# Patient Record
Sex: MALE | Race: WHITE | Employment: OTHER
[De-identification: names, ages, dates, MRNs, and addresses within clinical notes are randomized per-mention and may not be internally consistent; named-entity substitution may affect disease eponyms.]

---

## 2020-10-08 ENCOUNTER — TRANSCRIBE ORDERS (OUTPATIENT)
Dept: OTHER | Age: 45
End: 2020-10-08

## 2020-10-08 DIAGNOSIS — R63.0 POOR APPETITE: ICD-10-CM

## 2020-10-08 DIAGNOSIS — R11.0 NAUSEA: Primary | ICD-10-CM

## 2020-10-08 DIAGNOSIS — R63.4 WEIGHT LOSS: ICD-10-CM

## 2020-10-30 ENCOUNTER — OFFICE VISIT (OUTPATIENT)
Dept: NEUROLOGY | Facility: CLINIC | Age: 45
End: 2020-10-30
Payer: COMMERCIAL

## 2020-10-30 VITALS
HEIGHT: 73 IN | BODY MASS INDEX: 25.18 KG/M2 | SYSTOLIC BLOOD PRESSURE: 116 MMHG | HEART RATE: 110 BPM | WEIGHT: 190 LBS | DIASTOLIC BLOOD PRESSURE: 78 MMHG | OXYGEN SATURATION: 100 %

## 2020-10-30 DIAGNOSIS — R56.9 CONVULSIONS, UNSPECIFIED CONVULSION TYPE (H): ICD-10-CM

## 2020-10-30 DIAGNOSIS — R41.3 MEMORY LOSS: Primary | ICD-10-CM

## 2020-10-30 PROCEDURE — 99204 OFFICE O/P NEW MOD 45 MIN: CPT | Performed by: PSYCHIATRY & NEUROLOGY

## 2020-10-30 RX ORDER — ALPRAZOLAM 1 MG
1 TABLET ORAL 2 TIMES DAILY PRN
COMMUNITY
Start: 2020-01-29 | End: 2021-01-22

## 2020-10-30 RX ORDER — MESALAMINE 800 MG/1
800 TABLET, DELAYED RELEASE ORAL 2 TIMES DAILY
COMMUNITY

## 2020-10-30 RX ORDER — SUMATRIPTAN 100 MG/1
100 TABLET, FILM COATED ORAL
COMMUNITY
Start: 2020-10-19

## 2020-10-30 RX ORDER — LAMOTRIGINE 25 MG/1
TABLET ORAL
COMMUNITY
Start: 2020-10-15 | End: 2020-12-03

## 2020-10-30 RX ORDER — BUPROPION HYDROCHLORIDE 150 MG/1
1 TABLET ORAL DAILY
COMMUNITY
Start: 2020-10-27 | End: 2021-01-22

## 2020-10-30 RX ORDER — NORTRIPTYLINE HYDROCHLORIDE 50 MG/1
50 CAPSULE ORAL AT BEDTIME
COMMUNITY

## 2020-10-30 ASSESSMENT — PAIN SCALES - GENERAL: PAINLEVEL: NO PAIN (0)

## 2020-10-30 ASSESSMENT — MIFFLIN-ST. JEOR: SCORE: 1810.71

## 2020-10-30 NOTE — PROGRESS NOTES
Neurology History and Physical   [facility]    Patient:  Levy Bain  :  1975   Age:  44 year old   Today's Office Visit:  10/30/2020    Referring Provider:    No referring provider defined for this encounter.    History of Present Illness:    Levy Bain is a 44 year old right-handed male who presented for evaluation of his seizures.  The patient is accompanied by his mother-in-law, and his wife is also contributing to the history on the phone,   Patient had a seizure on 10/13/2020.  He collapsed in his office at home. At 8:30 am, he felt fine, no prodromes, came home after sending the kids to school. Next thing he remembers he was crawling on the floor. His wife witnessed the seizure.  His eyes were open, but he was unresponsive, he was stiff and shaking. No foaming or drooling, no tongue bite or urinary incontinence. Seizure lasted at least 2-3 min, and he didn't know he had a seizure. Ambulance picked him up right away, he came to when ambulance arrived. He had difficulty breathing. He had another seizure in the hospital around 1 pm. It was reported as GTC seizure, lasting couple minutes.    Wife is sure he had another episode couple months ago. He fell on one of the drawers in the garage, and had a cut across the chest and was bleeding, was sweaty and uneasy, and was confused. He didn't know what was happening.    4 years ago something similar happened.  They were in [place]. He felt really hot, didn't feel well, suddenly his head and eyes went back, and he started shaking and grunting.  They thought it was due to a lot a marijuana.   He denies warning before his seizures and is amnestic about them. Family hasn't noted any laterality with the seizures.   He was started on lamotrigine.  He is taking 25-50 now and is instructed to gradually increase to 100-100.   His memory is getting really bad in the past year, and especially in the past couple months. He also had severe headache  following the recent seizure.   He used to drink a lot occasionally- lauro drinking, he quit since last seizures.   Epilepsy Risk Factors:  No history of meningitis, encephalitis, no significant head injury. Her aunt had one seizure that he is aware of.   Precipitating factors:  Stress, lack of sleep.   Past Medical History: History of migraine, bipolar disorder, PTSD, severe depression and anxiety.   Social History     Socioeconomic History     Marital status:      Spouse name: Not on file     Number of children: Not on file     Years of education: Not on file     Highest education level: Not on file   Occupational History     Not on file   Social Needs     Financial resource strain: Not on file     Food insecurity     Worry: Not on file     Inability: Not on file     Transportation needs     Medical: Not on file     Non-medical: Not on file   Tobacco Use     Smoking status: Current Every Day Smoker     Smokeless tobacco: Never Used   Substance and Sexual Activity     Alcohol use: Not on file     Drug use: Not on file     Sexual activity: Not on file   Lifestyle     Physical activity     Days per week: Not on file     Minutes per session: Not on file     Stress: Not on file   Relationships     Social connections     Talks on phone: Not on file     Gets together: Not on file     Attends Christian service: Not on file     Active member of club or organization: Not on file     Attends meetings of clubs or organizations: Not on file     Relationship status: Not on file     Intimate partner violence     Fear of current or ex partner: Not on file     Emotionally abused: Not on file     Physically abused: Not on file     Forced sexual activity: Not on file   Other Topics Concern     Not on file   Social History Narrative     Not on file      Social/educational:  He owns a remodeling company, he has an associate degree. He did not need any special education. He is  and has 3 boys. He was a heavy drinker, binge  "drinking 10-12 drink at a time, but quit since his last seizure. He smokes 2 cigarettes a day, he uses medicinal marijuana that his psychiatrist prescribes.   Driving:  Currently patient is not driving.   Previous Evaluations for Epilepsy:  [facility]  MRI of Brain 10/13/2020: Normal  Review of Systems:  Lethargy / Tiredness:  No  Nausea / Vomiting:  No  Double Vision:  No  Sleepiness:  No  Depression:  No  Slowed Cognitive Function:  No  Memory Problems:  Yes  Poor Balance:  Yes  Dizziness:  Yes  Blurred Vision:  Yes    Current Outpatient Medications   Medication Sig Dispense Refill     ALPRAZolam (XANAX) 1 MG tablet Take 1 mg by mouth 2 times daily as needed       lamoTRIgine (LAMICTAL) 25 MG tablet        SUMAtriptan (IMITREX) 100 MG tablet Take 100 mg by mouth at onset of headache       buPROPion (WELLBUTRIN XL) 150 MG 24 hr tablet Take 1 tablet by mouth daily       mesalamine (ASACOL HD) 800 MG EC tablet Take 800 mg by mouth 3 times daily       nortriptyline (PAMELOR) 50 MG capsule Take 50 mg by mouth At Bedtime       omeprazole (PRILOSEC) 20 MG DR capsule Take 1 capsule by mouth daily       Past AEDs:  AED - ANTIEPILEPTIC DRUGS 10/15/2020   lamoTRIgine (Oral)  (25 MG TABS)       Exam:    /78   Pulse 110   Ht 1.854 m (6' 1\")   Wt 86.2 kg (190 lb)   SpO2 100%   BMI 25.07 kg/m       Wt Readings from Last 5 Encounters:   10/30/20 86.2 kg (190 lb)     GENERAL APPEARANCE:  Alert, awake, cooperative, in no apparent distress.      NEUROLOGICAL EXAMINATION:   MENTAL STATUS:  Alert and oriented  LANGUAGE/SPEECH:  No aphasia or dysarthria.   CRANIAL NERVES:  Pupils are round and reactive to light.  Extraocular movements are intact.  Facial sensation is intact to light touch.  Face is symmetric.  Tongue is midline.  Shrug shoulder is normal.  Hearing is intact to voice.   MOTOR:  Normal tone, bulk and strength 5/5 with no pronator drift.   SENSATION:  Intact to light " touch  COORDINATION:  Normal finger to nose.  No dysmetria or tremor.   REFLEXES:  Brisk all over.  Toes are downgoing.   GAIT:  Gait and tandem gait are steady.     Assessment and Plan:  patient has had 4 episodes of loss of consciousness, 3 of them witnessed and was reported as generalized tonic clonic seizure. His MRI and EEG has been reported normal (I only have the reports). I discussed doing an EEG to look for interictal abnormalities to find out what type of epilepsy he has. He denies auras and family hasn't noted any lateralizing signs with his seizures.     The patient doesn't drive, however, [place] regulations on seizures and driving were reviewed with the patient.  The patient clearly understands that she/he is prohibited from operating a motor vehicle within 3 months following any seizure (that will impair control of car) or other episode with sudden unconsciousness or inability to sit up, and that she/he is required to report this most recent seizure to the [facility] within 30 days after the event.    Avoid any activities that might lead to self-injury or injury of others, within 3 months following any seizure with impaired awareness or impaired motor control such activities include but are not limited to operating power tools, operating firearms, climbing ladders/trees/exposure to heights from which he might fall, exposure to vessels with hot cooking oil or water, and swimming alone.    2. Memory loss: this can be multifactorial, the underlying etiology for his epilepsy, his drinking history and 2 GTC seizures in one day. I suggested to do a neuropsychological evaluation for further evaluation.     - Continue tapering up lamotrigine to 100 mg bid  - Obtain lamotrigine level after reaching the goal dose  - 3 hour vEEG  - Neuropsychological evaluation  - Follow up in 2 months      As described above, I met with the patient for 40 minutes and during this time counseling was greater than 50% of the visit  time.  Rahel Elena MD

## 2020-10-30 NOTE — PATIENT INSTRUCTIONS
Patient Education     Electroencephalography (EEG)    Electroencephalography (EEG) is a test that measures your electrical brain wave activity. Brain cells (or neurons) communicate through electrical signals. These electrical patterns are measured by the EEG showing either normal or abnormal brain activity. The EEG is safe and painless.  What is EEG used for?  Your healthcare provider may order this test to check for seizures or other brain problems. For this test, several small metal disks (electrodes) are attached to the scalp with adhesives, or with water-based gel or paste. During the test, wavy lines (waveforms) are recorded on a screen or on paper. They will be studied to assess your brain function. In some people who are prone to seizures, parts of this test may slightly increase their chance of having a seizure. Sometimes it is necessary to repeat an EEG with sleep deprivation. During the test, you may be shown flashing lights. You may also be asked to take deep breaths multiple times during a short time period (hyperventilation). You may also be asked to go to sleep during the test.   EEG may be done in a doctor's office or a hospital lab. The test typically takes less than an hour, although much of the time is spent attaching the electrodes. Sometimes, the electrodes are left on for several hours or days so that the EEG test can record brain waves for a longer period of time. In these cases, you may need to stay in the hospital or can go home with a portable EEG recorder.   Before your test  Prepare for your test as instructed. Wash and dry your hair. But, don't use any hairstyling products. Your scalp and hair should be clean and free of excess oil. Take your routine medicines, unless told not to. You may be asked to sleep during the EEG. To help you do this, you may be told to stay up all or part of the night before the test. Or, you may be given medicine to help you sleep during the test. If so,  someone will need to drive you home after the test. Your test will take about 60 minutes. Arrive with enough time to check in.  My next appointment is:  ______________________________   For your safety and for the success of your test, tell the technologist about:    Any prescription or over-the-counter medicines, supplements, or herbs you take    Any seizures you may have had in the past   [employer] last reviewed this educational content on 12/1/2017 2000-2020 [employer]. [address] [place] [address]. All rights reserved. This information is not intended as a substitute for professional medical care. Always follow your healthcare professional's instructions.

## 2020-10-30 NOTE — LETTER
Date:November 5, 2020      Patient was self referred, no letter generated. Do not send.        [facility] Information

## 2020-10-30 NOTE — NURSING NOTE
"Levy Bain's goals for this visit include: consult  He requests these members of his care team be copied on today's visit information:     PCP: System, Provider Not In    Referring Provider:  No referring provider defined for this encounter.    /78   Pulse 110   Ht 1.854 m (6' 1\")   Wt 86.2 kg (190 lb)   SpO2 100%   BMI 25.07 kg/m      Do you need any medication refills at today's visit? n  "

## 2020-10-30 NOTE — LETTER
10/30/2020         RE: Levy Bain  [address] [place] [address]        Dear Colleague,    Thank you for referring your patient, Levy Bain, to the [facility] [place]. Please see a copy of my visit note below.    Neurology History and Physical   [facility]    Patient:  Levy Bain  :  1975   Age:  44 year old   Today's Office Visit:  10/30/2020    Referring Provider:    No referring provider defined for this encounter.    History of Present Illness:    Levy Bain is a 44 year old right-handed male who presented for evaluation of his seizures.  The patient is accompanied by his mother-in-law, and his wife is also contributing to the history on the phone,   Patient had a seizure on 10/13/2020.  He collapsed in his office at home. At 8:30 am, he felt fine, no prodromes, came home after sending the kids to school. Next thing he remembers he was crawling on the floor. His wife witnessed the seizure.  His eyes were open, but he was unresponsive, he was stiff and shaking. No foaming or drooling, no tongue bite or urinary incontinence. Seizure lasted at least 2-3 min, and he didn't know he had a seizure. Ambulance picked him up right away, he came to when ambulance arrived. He had difficulty breathing. He had another seizure in the hospital around 1 pm. It was reported as GTC seizure, lasting couple minutes.    Wife is sure he had another episode couple months ago. He fell on one of the drawers in the garage, and had a cut across the chest and was bleeding, was sweaty and uneasy, and was confused. He didn't know what was happening.    4 years ago something similar happened.  They were in [place]. He felt really hot, didn't feel well, suddenly his head and eyes went back, and he started shaking and grunting.  They thought it was due to a lot a marijuana.   He denies warning before his seizures and is amnestic about them. Family hasn't noted any  laterality with the seizures.   He was started on lamotrigine.  He is taking 25-50 now and is instructed to gradually increase to 100-100.   His memory is getting really bad in the past year, and especially in the past couple months. He also had severe headache following the recent seizure.   He used to drink a lot occasionally- lauro drinking, he quit since last seizures.   Epilepsy Risk Factors:  No history of meningitis, encephalitis, no significant head injury. Her aunt had one seizure that he is aware of.   Precipitating factors:  Stress, lack of sleep.   Past Medical History: History of migraine, bipolar disorder, PTSD, severe depression and anxiety.   Social History     Socioeconomic History     Marital status:      Spouse name: Not on file     Number of children: Not on file     Years of education: Not on file     Highest education level: Not on file   Occupational History     Not on file   Social Needs     Financial resource strain: Not on file     Food insecurity     Worry: Not on file     Inability: Not on file     Transportation needs     Medical: Not on file     Non-medical: Not on file   Tobacco Use     Smoking status: Current Every Day Smoker     Smokeless tobacco: Never Used   Substance and Sexual Activity     Alcohol use: Not on file     Drug use: Not on file     Sexual activity: Not on file   Lifestyle     Physical activity     Days per week: Not on file     Minutes per session: Not on file     Stress: Not on file   Relationships     Social connections     Talks on phone: Not on file     Gets together: Not on file     Attends Latter-day service: Not on file     Active member of club or organization: Not on file     Attends meetings of clubs or organizations: Not on file     Relationship status: Not on file     Intimate partner violence     Fear of current or ex partner: Not on file     Emotionally abused: Not on file     Physically abused: Not on file     Forced sexual activity: Not on file  "  Other Topics Concern     Not on file   Social History Narrative     Not on file      Social/educational:  He owns a remodeling company, he has an associate degree. He did not need any special education. He is  and has 3 boys. He was a heavy drinker, binge drinking 10-12 drink at a time, but quit since his last seizure. He smokes 2 cigarettes a day, he uses medicinal marijuana that his psychiatrist prescribes.   Driving:  Currently patient is not driving.   Previous Evaluations for Epilepsy:  EEG: 10/13/2020 at [facility] Care: normal  MRI of Brain 10/13/2020: Normal  Review of Systems:  Lethargy / Tiredness:  No  Nausea / Vomiting:  No  Double Vision:  No  Sleepiness:  No  Depression:  No  Slowed Cognitive Function:  No  Memory Problems:  Yes  Poor Balance:  Yes  Dizziness:  Yes  Blurred Vision:  Yes    Current Outpatient Medications   Medication Sig Dispense Refill     ALPRAZolam (XANAX) 1 MG tablet Take 1 mg by mouth 2 times daily as needed       lamoTRIgine (LAMICTAL) 25 MG tablet        SUMAtriptan (IMITREX) 100 MG tablet Take 100 mg by mouth at onset of headache       buPROPion (WELLBUTRIN XL) 150 MG 24 hr tablet Take 1 tablet by mouth daily       mesalamine (ASACOL HD) 800 MG EC tablet Take 800 mg by mouth 3 times daily       nortriptyline (PAMELOR) 50 MG capsule Take 50 mg by mouth At Bedtime       omeprazole (PRILOSEC) 20 MG DR capsule Take 1 capsule by mouth daily       Past AEDs:  AED - ANTIEPILEPTIC DRUGS 10/15/2020   lamoTRIgine (Oral)  (25 MG TABS)       Exam:    /78   Pulse 110   Ht 1.854 m (6' 1\")   Wt 86.2 kg (190 lb)   SpO2 100%   BMI 25.07 kg/m       Wt Readings from Last 5 Encounters:   10/30/20 86.2 kg (190 lb)     GENERAL APPEARANCE:  Alert, awake, cooperative, in no apparent distress.      NEUROLOGICAL EXAMINATION:   MENTAL STATUS:  Alert and oriented  LANGUAGE/SPEECH:  No aphasia or dysarthria.   CRANIAL NERVES:  Pupils are round and reactive to light.  Extraocular movements " are intact.  Facial sensation is intact to light touch.  Face is symmetric.  Tongue is midline.  Shrug shoulder is normal.  Hearing is intact to voice.   MOTOR:  Normal tone, bulk and strength 5/5 with no pronator drift.   SENSATION:  Intact to light touch  COORDINATION:  Normal finger to nose.  No dysmetria or tremor.   REFLEXES:  Brisk all over.  Toes are downgoing.   GAIT:  Gait and tandem gait are steady.     Assessment and Plan:  patient has had 4 episodes of loss of consciousness, 3 of them witnessed and was reported as generalized tonic clonic seizure. His MRI and EEG has been reported normal (I only have the reports). I discussed doing an EEG to look for interictal abnormalities to find out what type of epilepsy he has. He denies auras and family hasn't noted any lateralizing signs with his seizures.     The patient doesn't drive, however, [place] regulations on seizures and driving were reviewed with the patient.  The patient clearly understands that she/he is prohibited from operating a motor vehicle within 3 months following any seizure (that will impair control of car) or other episode with sudden unconsciousness or inability to sit up, and that she/he is required to report this most recent seizure to the DMV within 30 days after the event.    Avoid any activities that might lead to self-injury or injury of others, within 3 months following any seizure with impaired awareness or impaired motor control such activities include but are not limited to operating power tools, operating firearms, climbing ladders/trees/exposure to heights from which he might fall, exposure to vessels with hot cooking oil or water, and swimming alone.    2. Memory loss: this can be multifactorial, the underlying etiology for his epilepsy, his drinking history and 2 GTC seizures in one day. I suggested to do a neuropsychological evaluation for further evaluation.     - Continue tapering up lamotrigine to 100 mg bid  - Obtain  lamotrigine level after reaching the goal dose  - 3 hour vEEG  - Neuropsychological evaluation  - Follow up in 2 months      As described above, I met with the patient for 40 minutes and during this time counseling was greater than 50% of the visit time.  Rahel Elena MD          Again, thank you for allowing me to participate in the care of your patient.        Sincerely,        Rahel Elena MD

## 2020-11-12 ENCOUNTER — ANCILLARY PROCEDURE (OUTPATIENT)
Dept: NEUROLOGY | Facility: CLINIC | Age: 45
End: 2020-11-12
Attending: PSYCHIATRY & NEUROLOGY
Payer: COMMERCIAL

## 2020-11-16 ENCOUNTER — NURSE TRIAGE (OUTPATIENT)
Dept: NURSING | Facility: CLINIC | Age: 45
End: 2020-11-16

## 2020-11-16 NOTE — TELEPHONE ENCOUNTER
Patient has medication question re: mesalamine. Precsribed by Dr. Mamadou Whitley.  Clinic he goes to is [address] advised to have him call Dr. Whitley's clinic at [phone]    He verbalized understanding.    Marry Morgan RN/[place] Nurse Advisor    Additional Information    General information question, no triage required and triager able to answer question    Protocols used: INFORMATION ONLY CALL-A-AH

## 2020-11-23 ENCOUNTER — VIRTUAL VISIT (OUTPATIENT)
Dept: NEUROLOGY | Facility: CLINIC | Age: 45
End: 2020-11-23
Payer: COMMERCIAL

## 2020-11-23 ENCOUNTER — TELEPHONE (OUTPATIENT)
Dept: NEUROLOGY | Facility: CLINIC | Age: 45
End: 2020-11-23

## 2020-11-23 DIAGNOSIS — G40.409 GENERALIZED TONIC-CLONIC SEIZURE (H): Primary | ICD-10-CM

## 2020-11-23 SDOH — HEALTH STABILITY: MENTAL HEALTH: HOW OFTEN DO YOU HAVE A DRINK CONTAINING ALCOHOL?: NEVER

## 2020-11-23 ASSESSMENT — PATIENT HEALTH QUESTIONNAIRE - PHQ9: SUM OF ALL RESPONSES TO PHQ QUESTIONS 1-9: 16

## 2020-11-23 NOTE — LETTER
"2020       RE: Levy Bain  : 1975   MRN: [id number]      Dear Colleague,    Thank you for referring your patient, Levy Bain, to the [facility] EPILEPSY CARE at [facility]. Please see a copy of my visit note below.    Levy Bain is a 44 year old male who is being evaluated via a billable telephone visit.      The patient has been notified of following:     \"This telephone visit will be conducted via a call between you and your physician/provider. We have found that certain health care needs can be provided without the need for a physical exam.  This service lets us provide the care you need with a short phone conversation.  If a prescription is necessary we can send it directly to your pharmacy.  If lab work is needed we can place an order for that and you can then stop by our lab to have the test done at a later time.    Telephone visits are billed at different rates depending on your insurance coverage. During this emergency period, for some insurers they may be billed the same as an in-person visit.  Please reach out to your insurance provider with any questions.    If during the course of the call the physician/provider feels a telephone visit is not appropriate, you will not be charged for this service.\"    Patient has given verbal consent for Telephone visit?  Yes    What phone number would you like to be contacted at? [phone]    How would you like to obtain your AVS? [facility]      [facility] Epilepsy Care Progress Note      Patient:  Levy Bain  :  1975   Age:  44 year old   Today's virtual Visit:  2020    History of Present Illness:    Levy is participating in this virtual visit with his wife for a follow up on his seizures.  He was last seen last month for evaluation of episodes of loss of consciousness and convulsions.  His most recent episodes where on , which he had 2 episodes within 24 hours.  His EEG and " outside MRI were normal.  He had at least couple more episodes of loss of consciousness and possible convulsions in the past.  He is currently taking lamotrigine 75 mg twice a day which he is going to increase to 75 mg in the morning and 100 mg at night tomorrow.  He occasionally gets dizzy, he is taking his lamotrigine on empty stomach.    Memory loss: Levy was complaining of memory loss in the previous visit, which has been also noted by family members.  He had some memory problems in the past, which has been worse in the first couple weeks following his recent seizures.  His wife believes currently it's better compared to the time immediately following his recent seizures and is somewhat similar to what where he was before these recent seizures, however Levy himself feels his memory is worse now. It's both long-term and short-term.  He has an appointment for neuropsychological evaluation on January 5, 2020.      Current Outpatient Medications   Medication Sig Dispense Refill     ALPRAZolam (XANAX) 1 MG tablet Take 1 mg by mouth 2 times daily as needed       buPROPion (WELLBUTRIN XL) 150 MG 24 hr tablet Take 1 tablet by mouth daily       lamoTRIgine (LAMICTAL) 25 MG tablet Taking 3 25mg tablets in AM and PM. Will start taking 3 in AM and 4 in PM on 11/24/20       mesalamine (ASACOL HD) 800 MG EC tablet Take 800 mg by mouth 3 times daily       nortriptyline (PAMELOR) 50 MG capsule Take 50 mg by mouth At Bedtime       omeprazole (PRILOSEC) 20 MG DR capsule Take 1 capsule by mouth daily       SUMAtriptan (IMITREX) 100 MG tablet Take 100 mg by mouth at onset of headache          Review of Systems:  Lethargy / Tiredness:  No  Nausea / Vomiting:  No  Double Vision:  No  Blurred vision: Yes  Slowed Cognitive Function:  No  Memory Problems:  Yes  Poor Balance:  No  Dizziness:  Yes, occasionally after taking lamotrigine on empty stomach.  Blurred Vision:  Yes, occasionally, he had it before starting the  medication.    Other Issues:    Is patient safe to drive:  The patient should avoid driving until January 13, 2021.      Assessment and Plan:   Generalized tonic-clonic seizures: Probable focal to bilateral tonic-clonic seizures.  Patient's EEG and MRI have been normal.  Since patient had more than 1 episode of loss of consciousness and convulsion, I recommended that he continue taking an antiseizure medication.  He is currently in the process of tapering up his lamotrigine.  I suggested to check his lamotrigine level after he reached 100 mg twice a day and we will adjust the dose accordingly.    [place] regulations on seizures and driving were reviewed with the patient.  The patient clearly understands that she/he is prohibited from operating a motor vehicle within 3 months following any seizure (that will impair control of car) or other episode with sudden unconsciousness or inability to sit up, and that she/he is required to report this most recent seizure to the DMV within 30 days after the event.    Avoid any activities that might lead to self-injury or injury of others, within 3 months following any seizure with impaired awareness or impaired motor control such activities include but are not limited to operating power tools, operating firearms, climbing ladders/trees/exposure to heights from which he might fall, exposure to vessels with hot cooking oil or water, and swimming alone.    -Continue tapering lamotrigine up to 100 mg twice a day.  -Obtain lamotrigine level 1 week after reaching to 100 mg twice a day.  -Follow-up in 3 months        As described above, I talked with the patient and his wife for 18 minutes via D[employer]im[employer] and during this time counseling was greater than 50% of the visit time.  Rahel Elena MD                          Again, thank you for allowing me to participate in the care of your patient.      Sincerely,    Rahel Elena MD

## 2020-11-23 NOTE — PROGRESS NOTES
"Levy Bain is a 44 year old male who is being evaluated via a billable telephone visit.      The patient has been notified of following:     \"This telephone visit will be conducted via a call between you and your physician/provider. We have found that certain health care needs can be provided without the need for a physical exam.  This service lets us provide the care you need with a short phone conversation.  If a prescription is necessary we can send it directly to your pharmacy.  If lab work is needed we can place an order for that and you can then stop by our lab to have the test done at a later time.    Telephone visits are billed at different rates depending on your insurance coverage. During this emergency period, for some insurers they may be billed the same as an in-person visit.  Please reach out to your insurance provider with any questions.    If during the course of the call the physician/provider feels a telephone visit is not appropriate, you will not be charged for this service.\"    Patient has given verbal consent for Telephone visit?  Yes    What phone number would you like to be contacted at? [phone]    How would you like to obtain your AVS? [place]      [facility]      Patient:  Levy Bain  :  1975   Age:  44 year old   Today's virtual Visit:  2020    History of Present Illness:    Levy is participating in this virtual visit with his wife for a follow up on his seizures.  He was last seen last month for evaluation of episodes of loss of consciousness and convulsions.  His most recent episodes where on , which he had 2 episodes within 24 hours.  His EEG and outside MRI were normal.  He had at least couple more episodes of loss of consciousness and possible convulsions in the past.  He is currently taking lamotrigine 75 mg twice a day which he is going to increase to 75 mg in the morning and 100 mg at night tomorrow.  He occasionally " gets dizzy, he is taking his lamotrigine on empty stomach.    Memory loss: Levy was complaining of memory loss in the previous visit, which has been also noted by family members.  He had some memory problems in the past, which has been worse in the first couple weeks following his recent seizures.  His wife believes currently it's better compared to the time immediately following his recent seizures and is somewhat similar to what where he was before these recent seizures, however Levy himself feels his memory is worse now. It's both long-term and short-term.  He has an appointment for neuropsychological evaluation on January 5, 2020.      Current Outpatient Medications   Medication Sig Dispense Refill     ALPRAZolam (XANAX) 1 MG tablet Take 1 mg by mouth 2 times daily as needed       buPROPion (WELLBUTRIN XL) 150 MG 24 hr tablet Take 1 tablet by mouth daily       lamoTRIgine (LAMICTAL) 25 MG tablet Taking 3 25mg tablets in AM and PM. Will start taking 3 in AM and 4 in PM on 11/24/20       mesalamine (ASACOL HD) 800 MG EC tablet Take 800 mg by mouth 3 times daily       nortriptyline (PAMELOR) 50 MG capsule Take 50 mg by mouth At Bedtime       omeprazole (PRILOSEC) 20 MG DR capsule Take 1 capsule by mouth daily       SUMAtriptan (IMITREX) 100 MG tablet Take 100 mg by mouth at onset of headache          Review of Systems:  Lethargy / Tiredness:  No  Nausea / Vomiting:  No  Double Vision:  No  Blurred vision: Yes  Slowed Cognitive Function:  No  Memory Problems:  Yes  Poor Balance:  No  Dizziness:  Yes, occasionally after taking lamotrigine on empty stomach.  Blurred Vision:  Yes, occasionally, he had it before starting the medication.    Other Issues:    Is patient safe to drive:  The patient should avoid driving until January 13, 2021.      Assessment and Plan:   Generalized tonic-clonic seizures: Probable focal to bilateral tonic-clonic seizures.  Patient's EEG and MRI have been normal.  Since patient had more than  1 episode of loss of consciousness and convulsion, I recommended that he continue taking an antiseizure medication.  He is currently in the process of tapering up his lamotrigine.  I suggested to check his lamotrigine level after he reached 100 mg twice a day and we will adjust the dose accordingly.    [place] regulations on seizures and driving were reviewed with the patient.  The patient clearly understands that she/he is prohibited from operating a motor vehicle within 3 months following any seizure (that will impair control of car) or other episode with sudden unconsciousness or inability to sit up, and that she/he is required to report this most recent seizure to the DMV within 30 days after the event.    Avoid any activities that might lead to self-injury or injury of others, within 3 months following any seizure with impaired awareness or impaired motor control such activities include but are not limited to operating power tools, operating firearms, climbing ladders/trees/exposure to heights from which he might fall, exposure to vessels with hot cooking oil or water, and swimming alone.    -Continue tapering lamotrigine up to 100 mg twice a day.  -Obtain lamotrigine level 1 week after reaching to 100 mg twice a day.  -Follow-up in 3 months        As described above, I talked with the patient and his wife for 18 minutes via Doximity and during this time counseling was greater than 50% of the visit time.  Rahel Elena MD

## 2020-12-02 ENCOUNTER — TELEPHONE (OUTPATIENT)
Dept: NEUROLOGY | Facility: CLINIC | Age: 45
End: 2020-12-02

## 2020-12-02 DIAGNOSIS — G40.409 GENERALIZED TONIC-CLONIC SEIZURE (H): Primary | ICD-10-CM

## 2020-12-02 NOTE — TELEPHONE ENCOUNTER
[facility] Call Center    Phone Message    May a detailed message be left on voicemail: yes     Reason for Call: Medication Refill Request    Has the patient contacted the pharmacy for the refill? Yes   Name of medication being requested: lamoTRIgine (LAMICTAL) 25 MG tablet  Provider who prescribed the medication: [facility]/ Gaurav   Pharmacy: [facility]   Date medication is needed: Patient said that Dr. Nolasco wanted to change it to 100 mg tablets in the morning and at night. Please advise. Thank you.      Action Taken: Message routed to:  Adult Clinics: Neurology p [id number]    Travel Screening: Not Applicable

## 2020-12-03 RX ORDER — LAMOTRIGINE 25 MG/1
TABLET ORAL
Qty: 210 TABLET | Refills: 2 | Status: SHIPPED | OUTPATIENT
Start: 2020-12-03 | End: 2020-12-11

## 2020-12-03 RX ORDER — LAMOTRIGINE 25 MG/1
100 TABLET ORAL
Status: CANCELLED | OUTPATIENT
Start: 2020-12-03

## 2020-12-03 NOTE — TELEPHONE ENCOUNTER
Dr Nolasco has never prescribed this medication for the pt. Routed to her to review and enter a new RX.  Heather Almanzar, RNCC  Neurology

## 2020-12-03 NOTE — TELEPHONE ENCOUNTER
The patient called back regarding message below. He says he is out of medication and hoping he can pick this up today. Please advise. Thank you.

## 2020-12-09 ENCOUNTER — TELEPHONE (OUTPATIENT)
Dept: NEUROLOGY | Facility: CLINIC | Age: 45
End: 2020-12-09

## 2020-12-09 NOTE — TELEPHONE ENCOUNTER
Called the pt back to discuss but was unable to reach them, a message was left on their VM requesting a call back to the clinic.   Heather Almanzar RN

## 2020-12-09 NOTE — TELEPHONE ENCOUNTER
[facility] Call Center    Phone Message    May a detailed message be left on voicemail: yes     Reason for Call: Other: pt had a seizure, fell and hit his head. pt went to hospital by ambulance. pt would like to speak with [facility] regarding this seizure episode. please advise     Action Taken: Message routed to:  Adult Clinics: Neurology p [id number]

## 2020-12-10 NOTE — TELEPHONE ENCOUNTER
Situation reviewed briefly with house doctor. It was decided that we should schedule the patient for a telephone visit with Dr. Baez for tomorrow. Patient was agreeable.

## 2020-12-10 NOTE — TELEPHONE ENCOUNTER
RN spoke to the pt who reports a recent trip to the ED for what they assumed was a seizure, see encounter on 12/8/20 from [facility] in [facility].  He was recently seen by Dr Nolasco who had him ramping up his Lamotrigine dose to 100mg twice daily which he has been at for the past 2 weeks.   He would like to know what his next steps should be. Have his Lamotrigine level drawn or make adjustments to his dose?   He was informed that Dr Nolasco is out of the office this week but that we would pass along the message to the team at [facility].    Heather Almanzar, HANNAHCC  Neurology

## 2020-12-11 ENCOUNTER — VIRTUAL VISIT (OUTPATIENT)
Dept: NEUROLOGY | Facility: CLINIC | Age: 45
End: 2020-12-11
Payer: COMMERCIAL

## 2020-12-11 DIAGNOSIS — G40.409 GENERALIZED TONIC-CLONIC SEIZURE (H): ICD-10-CM

## 2020-12-11 RX ORDER — SUMATRIPTAN 100 MG/1
100 TABLET, FILM COATED ORAL
Status: CANCELLED | OUTPATIENT
Start: 2020-12-11

## 2020-12-11 RX ORDER — LAMOTRIGINE 100 MG/1
TABLET ORAL
Qty: 60 TABLET | Refills: 3 | Status: SHIPPED | OUTPATIENT
Start: 2020-12-11 | End: 2021-01-08

## 2020-12-11 RX ORDER — PHENYTOIN SODIUM 100 MG/1
CAPSULE, EXTENDED RELEASE ORAL
Qty: 90 CAPSULE | Refills: 3 | Status: SHIPPED | OUTPATIENT
Start: 2020-12-11 | End: 2020-12-12
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
## 2020-12-12 RX ORDER — PHENYTOIN SODIUM 100 MG/1
CAPSULE, EXTENDED RELEASE ORAL
Qty: 90 CAPSULE | Refills: 3 | Status: SHIPPED | OUTPATIENT
Start: 2020-12-12 | End: 2021-01-22

## 2020-12-14 ENCOUNTER — TELEPHONE (OUTPATIENT)
Dept: NEUROLOGY | Facility: CLINIC | Age: 45
End: 2020-12-14

## 2020-12-14 NOTE — TELEPHONE ENCOUNTER
Chart reviewed,  Prescription placed 12/11/20 was missing some instructions.  The on-call provider ( had placed a prescription on 12/12/20 with the instructions in place.  Call placed to the pharmacy to confirm that the concern they had been calling about had been resolved    Spoke with Jered, problem has been corrected

## 2020-12-14 NOTE — TELEPHONE ENCOUNTER
Please clarify frequency of how patient will be using medication and days supply limitations per pharmacy request. Routed to RN pool as instructed.

## 2020-12-29 ENCOUNTER — VIRTUAL VISIT (OUTPATIENT)
Dept: NEUROLOGY | Facility: CLINIC | Age: 45
End: 2020-12-29
Payer: COMMERCIAL

## 2020-12-29 DIAGNOSIS — G40.409 GENERALIZED TONIC-CLONIC SEIZURE (H): Primary | ICD-10-CM

## 2020-12-29 RX ORDER — CLONAZEPAM 2 MG/1
2 TABLET ORAL 2 TIMES DAILY
COMMUNITY
Start: 2020-12-22
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
## 2021-01-04 ENCOUNTER — HEALTH MAINTENANCE LETTER (OUTPATIENT)
Age: 46
End: 2021-01-04

## 2021-01-05 ENCOUNTER — OFFICE VISIT (OUTPATIENT)
Dept: NEUROLOGY | Facility: CLINIC | Age: 46
End: 2021-01-05
Attending: PSYCHIATRY & NEUROLOGY
Payer: COMMERCIAL

## 2021-01-05 DIAGNOSIS — F41.9 ANXIETY: ICD-10-CM

## 2021-01-05 DIAGNOSIS — G40.919 EPILEPSY WITH ALTERED CONSCIOUSNESS WITH INTRACTABLE EPILEPSY (H): Primary | ICD-10-CM

## 2021-01-05 DIAGNOSIS — F06.8 OTHER SPECIFIED MENTAL DISORDERS DUE TO KNOWN PHYSIOLOGICAL CONDITION: ICD-10-CM

## 2021-01-05 DIAGNOSIS — F33.2 SEVERE RECURRENT MAJOR DEPRESSION WITHOUT PSYCHOTIC FEATURES (H): ICD-10-CM

## 2021-01-05 NOTE — PROGRESS NOTES
Patient was seen for neuropsychological evaluation at the request of Dr. Amaury Baez, for the purposes of diagnostic clarification and treatment planning.  2 hrs 28 min of test administration and scoring were provided by this writer, Carolyne Sorto.  Please see Dr. Kennedy Irvin's report for a full interpretation of the findings.
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
## 2021-01-06 NOTE — PROGRESS NOTES
Name: Levy Bain MRN: [id number]  : 1975 ZAMARRIPA: 2021  Staff: SANCHEZ Tech:  Age: 45  Sex: Male Hand: Right Educ: 13-15  Vision: 20/20 ?with correction / ?without correction    ORIENTATION     Time  -0     Place       Personal info         WAIS-IV     Raw SSa     Similarities  28 11     Information  16 11     Block Design  24 6     Matrix Reasoning 21 12     Digit Span  27 9      Arithmetic  18 13     Coding  45 6    JESENIA-O    Raw    %ile     Copy    31.0    2-5     Time to Copy  398     ?1    WRAT4   SS      Word Reading  101     COWAT (FAS)   Raw: 28   T: 36    Semantic Fluency/Animals   Raw:  17  T-score:  41    BOSTON NAMING TEST   Raw: 57  T: 48     COMPLEX IDEATIONAL MATERIAL   Raw:   T: 25    TRAILS  Raw  Err T    A 24  0 52   B 80  1 40    STROOP Raw T   Word 91 41   Color  54 33       Color/Word  32 37    SHARP FACIAL RECOGNITION   Raw: 45  Interp: WNL    GROOVED PEGBOARD    Raw  T Drops   RH  84  33 0     29 0    BDI-II  Raw:  41  Interpretation: Severe    BISHNU  Raw:  53  Interpretation: Severe    WMS-III  Raw SS      Faces I 40 12     Faces II 40 12     Spatial Span 19 13    HVLT-R     Trial 1 2 3      7 9 10  Raw T     Total Learning (1-3) 26 44     Delayed Recall  10 49     Percent Retention 100 55     Recognition Hits/FP 11/0 49    BVMT-R     Trial 1 2 3      2 7 6  Raw T / %ile     Total Learning (1-3) 15 30     Delayed Recall  6 31     Percent Retention 86 11th-16th     Recognition Hits/FP 5/0 11th-16th    TOMM T1: 50  T2: NA T3: NA    MMPI-2-RF   RCd: 85 VRIN-r:  53   RC1: 95 VAZQUEZ-r:  65T   RC2: 76 F-r:  106   RC3: 43 Fp-r:  68   RC4: 65 Fs:  99   RC6: 66 FBS-r:  96   RC7: 81 RBS:  92   RC8: 73 L-r:  47   RC9: 58 K-r:  38

## 2021-01-08 ENCOUNTER — VIRTUAL VISIT (OUTPATIENT)
Dept: NEUROLOGY | Facility: CLINIC | Age: 46
End: 2021-01-08
Payer: COMMERCIAL

## 2021-01-08 DIAGNOSIS — G40.409 GENERALIZED TONIC-CLONIC SEIZURE (H): ICD-10-CM

## 2021-01-08 PROCEDURE — 99214 OFFICE O/P EST MOD 30 MIN: CPT | Mod: GT | Performed by: PSYCHIATRY & NEUROLOGY

## 2021-01-08 NOTE — PROGRESS NOTES
"Levy is a 45 year old who is being evaluated via a billable video visit.      How would you like to obtain your AVS? MyChart  If the video visit is dropped, the invitation should be resent by: Text to cell phone: [phone]  Will anyone else be joining your video visit? No       NEUROLOGY PROGRESS NOTE   [facility]    Patient:Levy Bain  : 1975  Age: 45 year old  Today's Office Visit: 2021    History of present illness:     Mr. Levy Bain is participating in this virtual visit for follow-up on his seizures.  He had a seizure in 2020.  He was at home, playing card with a friend, after he got up he had a seizure.  He fell and had a convlusion and hit his head to the cabinet.  It lasted about 2-3 minutes.  Head CT was unremarkable.  Was taking lamotrigine 100 mg twice a day at that time.    He saw Dr. Baez, and started Dilantin 100-200. He denies dizziness or imbalance. Feels nauseous.      He had serotonin syndrome between [age]. He was on Xanax, luvox, nortriptyline and Wellbutrin. He had severe tremors, and was nauseous and had headache. These meds were stopped.     He still has some nausea but tremors improved.   He is taking Klonopin 2 mg am and pm    Memory loss: Patient has been neuropsychological evaluation on 2021: Dr. Irvin's impression was \"Mr. Bain demonstrated a pattern of weaknesses and variability that is generally nonspecific in nature, but potentially due to factors such as severe depression, severe anxiety, medication toxicity, and sleep troubles.  These findings are generally compatible with subcortical brain dysfunction, and are otherwise not lateralizing or localizing in nature.  I do not see evidence to suggest that there is hemispheric or focal brain dysfunction.  In this exam, weaknesses were identified in cognitive speed, psychomotor speed, and in some aspects of attention.  Other cognitive abilities, including memory, were normal and " "performed in keeping with his average to above average range cognitive baseline.  That said, variability in cognitive speed can manifest as \"memory\" problems in day-to-day life such that if an individual has trouble keeping up with the pace of information presentation, they may not taking as much information as they are accustomed to.  As alluded to above, he is reporting severe symptoms of depression, severe symptoms of anxiety, and widespread psychological disturbance, including a somatically focused depression and anxiety syndrome, on psychological questionnaires.  I do suspect that these psychological factors are contributing to his subjective concerns about cognitive dysfunction.\"    He ss going to start inpatient rehab for his mental health issues.  He is going to work with therapist and psychiatrist starting next week.     Current Outpatient Medications   Medication Sig Dispense Refill     clonazePAM (KLONOPIN) 2 MG tablet 2 mg 2 times daily        lamoTRIgine (LAMICTAL) 100 MG tablet 1 twice a day 60 tablet 3     mesalamine (ASACOL HD) 800 MG EC tablet Take 800 mg by mouth 3 times daily       omeprazole (PRILOSEC) 20 MG DR capsule Take 1 capsule by mouth daily       phenytoin (DILANTIN) 100 MG capsule Take 1 capsule (100 mg) by mouth daily AND 2 capsules (200 mg) At Bedtime. # # #  Dispense BRAND ONLY : Dilantin. No generic drug substitution  # # # 90 capsule 3     SUMAtriptan (IMITREX) 100 MG tablet Take 100 mg by mouth at onset of headache       ALPRAZolam (XANAX) 1 MG tablet Take 1 mg by mouth 2 times daily as needed Up to 4 mg PRN       buPROPion (WELLBUTRIN XL) 150 MG 24 hr tablet Take 1 tablet by mouth daily       nortriptyline (PAMELOR) 50 MG capsule Take 50 mg by mouth At Bedtime       Review of Systems:  Review of system was performed and pertinent negative and positives are mentioned in HPI.    Exam:    Wt Readings from Last 5 Encounters:   10/30/20 86.2 kg (190 lb)       Alert and oriented, NAD, " no aphasia or dysarthria, EOMI, face is symmetric, moves upper extremities against gravity, finger-to-nose normal.    Assessment/Plan:    1. Generalized tonic-clonic seizures: Probable focal to bilateral tonic-clonic seizures.  Patient's EEG and MRI have been normal.  Patient had breakthrough seizures on lamotrigine.  He was started on Dilantin as a bridge while we increase lamotrigine per Dr. Baez's recommendation.  I discussed long-term side effects of Dilantin and explained that I will only use it while we increase his lamotrigine.  I discussed increasing his lamotrigine gradually to 200 mg twice a day.  He will need to continue his Dilantin until he gets to the therapeutic level for lamotrigine and then will gradually taper his Dilantin to off.    2.  Memory loss: According to neuropsychological evaluation it appears that the patient's psychological factors including severe depression and anxiety are contributing to his subjective cognitive decline and memory loss.  The patient is going to have inpatient rehab for his mental health issues, working with psychiatrist and therapist.    [place] regulations on seizures and driving were reviewed with the patient.  The patient clearly understands that she/he is prohibited from operating a motor vehicle within 3 months following any seizure (that will impair control of car) or other episode with sudden unconsciousness or inability to sit up, and that she/he is required to report this most recent seizure to the DMV within 30 days after the event.    Avoid any activities that might lead to self-injury or injury of others, within 3 months following any seizure with impaired awareness or impaired motor control such activities include but are not limited to operating power tools, operating firearms, climbing ladders/trees/exposure to heights from which he might fall, exposure to vessels with hot cooking oil or water, and swimming alone.    -Increase lamotrigine by 50  mg/day every week up to 200 mg twice a day    -Continue Dilantin as before    -Obtain lamotrigine level in 4 weeks    -Follow-up in 4 weeks      As described above, I met with the patient for 33 minutes (4:05-4:38) and during this time counseling was greater than 50% of the visit time.  Rahel Elena MD

## 2021-01-08 NOTE — LETTER
"    2021         RE: Levy Bain  [address] [place] [address]        Dear Colleague,    Thank you for referring your patient, Levy Bain, to the [facility]UROLOGY [facility] [place]. Please see a copy of my visit note below.    Levy is a 45 year old who is being evaluated via a billable video visit.      How would you like to obtain your AVS? MyChart  If the video visit is dropped, the invitation should be resent by: Text to cell phone: [phone]  Will anyone else be joining your video visit? No       NEUROLOGY PROGRESS NOTE   [facility]    Patient:Levy Bain  : 1975  Age: 45 year old  Today's Office Visit: 2021    History of present illness:     Mr. Levy Bain is participating in this virtual visit for follow-up on his seizures.  He had a seizure in 2020.  He was at home, playing card with a friend, after he got up he had a seizure.  He fell and had a convlusion and hit his head to the cabinet.  It lasted about 2-3 minutes.  Head CT was unremarkable.  Was taking lamotrigine 100 mg twice a day at that time.    He saw Dr. Baez, and started Dilantin 100-200. He denies dizziness or imbalance. Feels nauseous.      He had serotonin syndrome between [age]. He was on Xanax, luvox, nortriptyline and Wellbutrin. He had severe tremors, and was nauseous and had headache. These meds were stopped.     He still has some nausea but tremors improved.   He is taking Klonopin 2 mg am and pm    Memory loss: Patient has been neuropsychological evaluation on 2021: Dr. Irvin's impression was \"Mr. Bain demonstrated a pattern of weaknesses and variability that is generally nonspecific in nature, but potentially due to factors such as severe depression, severe anxiety, medication toxicity, and sleep troubles.  These findings are generally compatible with subcortical brain dysfunction, and are otherwise not lateralizing or localizing in nature.  I do " "not see evidence to suggest that there is hemispheric or focal brain dysfunction.  In this exam, weaknesses were identified in cognitive speed, psychomotor speed, and in some aspects of attention.  Other cognitive abilities, including memory, were normal and performed in keeping with his average to above average range cognitive baseline.  That said, variability in cognitive speed can manifest as \"memory\" problems in day-to-day life such that if an individual has trouble keeping up with the pace of information presentation, they may not taking as much information as they are accustomed to.  As alluded to above, he is reporting severe symptoms of depression, severe symptoms of anxiety, and widespread psychological disturbance, including a somatically focused depression and anxiety syndrome, on psychological questionnaires.  I do suspect that these psychological factors are contributing to his subjective concerns about cognitive dysfunction.\"    He ss going to start inpatient rehab for his mental health issues.  He is going to work with therapist and psychiatrist starting next week.     Current Outpatient Medications   Medication Sig Dispense Refill     clonazePAM (KLONOPIN) 2 MG tablet 2 mg 2 times daily        lamoTRIgine (LAMICTAL) 100 MG tablet 1 twice a day 60 tablet 3     mesalamine (ASACOL HD) 800 MG EC tablet Take 800 mg by mouth 3 times daily       omeprazole (PRILOSEC) 20 MG DR capsule Take 1 capsule by mouth daily       phenytoin (DILANTIN) 100 MG capsule Take 1 capsule (100 mg) by mouth daily AND 2 capsules (200 mg) At Bedtime. # # #  Dispense BRAND ONLY : Dilantin. No generic drug substitution  # # # 90 capsule 3     SUMAtriptan (IMITREX) 100 MG tablet Take 100 mg by mouth at onset of headache       ALPRAZolam (XANAX) 1 MG tablet Take 1 mg by mouth 2 times daily as needed Up to 4 mg PRN       buPROPion (WELLBUTRIN XL) 150 MG 24 hr tablet Take 1 tablet by mouth daily       nortriptyline (PAMELOR) 50 MG " capsule Take 50 mg by mouth At Bedtime       Review of Systems:  Review of system was performed and pertinent negative and positives are mentioned in HPI.    Exam:    Wt Readings from Last 5 Encounters:   10/30/20 86.2 kg (190 lb)       Alert and oriented, NAD, no aphasia or dysarthria, EOMI, face is symmetric, moves upper extremities against gravity, finger-to-nose normal.    Assessment/Plan:    1. Generalized tonic-clonic seizures: Probable focal to bilateral tonic-clonic seizures.  Patient's EEG and MRI have been normal.  Patient had breakthrough seizures on lamotrigine.  He was started on Dilantin as a bridge while we increase lamotrigine per Dr. Baez's recommendation.  I discussed long-term side effects of Dilantin and explained that I will only use it while we increase his lamotrigine.  I discussed increasing his lamotrigine gradually to 200 mg twice a day.  He will need to continue his Dilantin until he gets to the therapeutic level for lamotrigine and then will gradually taper his Dilantin to off.    2.  Memory loss: According to neuropsychological evaluation it appears that the patient's psychological factors including severe depression and anxiety are contributing to his subjective cognitive decline and memory loss.  The patient is going to have inpatient rehab for his mental health issues, working with psychiatrist and therapist.    [place] regulations on seizures and driving were reviewed with the patient.  The patient clearly understands that she/he is prohibited from operating a motor vehicle within 3 months following any seizure (that will impair control of car) or other episode with sudden unconsciousness or inability to sit up, and that she/he is required to report this most recent seizure to the DMV within 30 days after the event.    Avoid any activities that might lead to self-injury or injury of others, within 3 months following any seizure with impaired awareness or impaired motor control  such activities include but are not limited to operating power tools, operating firearms, climbing ladders/trees/exposure to heights from which he might fall, exposure to vessels with hot cooking oil or water, and swimming alone.    -Increase lamotrigine by 50 mg/day every week up to 200 mg twice a day    -Continue Dilantin as before    -Obtain lamotrigine level in 4 weeks    -Follow-up in 4 weeks      As described above, I met with the patient for 33 minutes (4:05-4:38) and during this time counseling was greater than 50% of the visit time.  Rahel Elena MD

## 2021-01-18 ENCOUNTER — TELEPHONE (OUTPATIENT)
Dept: NEUROLOGY | Facility: CLINIC | Age: 46
End: 2021-01-18

## 2021-01-20 NOTE — TELEPHONE ENCOUNTER
Message from Amaury Barnes MD  You 9 minutes ago (3:06 PM)     Hi- move appoint ment to Friday this week- I have openings- Amaury    Message text      Unable to schedule due to IT issues  Scheduling staff will call him to arrange a video visit

## 2021-01-20 NOTE — TELEPHONE ENCOUNTER
Patient returned call.    He notes he has been suffering with severe nausea, and his psychiatrist is also making changes to his medications  He wants  recommendations or comments about the changes    Patient was on alprazolam fo about 4 years, but in the hospital was changed to clonazepam.  His psychiatrist wants to taper off the clonazepam over then upcoming weeks.  Patient feels  told him this should takes months and would like 's input.    Additionally, patient is in the process of trying to ramp up BROWNING with the eventual goal of discontinuation of PHT    He is wondering if that now he is at BROWNING 150mg bid he could take less of the dilantin    Current ASDs  -200  BROWNING 150-150 (started about 10 days ago)    Patient complaining of constant nausea  Other providers recommended discussion with Neuro for the med interaction.  Psychiatrist also prescribed medical cannabis for the nausea.  Patient would like advice n which preparation of medical cannabis he should be taking.    I informed the patient I would send a message to  about some of the questions, but for the medical cannabis, he should talk with the dispensary pharmacist, as the program is not managed by the physicians  I let the patient know he may not hear back until next week due to physician availability, and he was okay with that.    No other questions

## 2021-01-22 ENCOUNTER — VIRTUAL VISIT (OUTPATIENT)
Dept: NEUROLOGY | Facility: CLINIC | Age: 46
End: 2021-01-22
Payer: COMMERCIAL

## 2021-01-22 DIAGNOSIS — G40.409 GENERALIZED TONIC-CLONIC SEIZURE (H): Primary | ICD-10-CM

## 2021-01-22 RX ORDER — PHENYTOIN SODIUM 100 MG/1
CAPSULE, EXTENDED RELEASE ORAL
Qty: 90 CAPSULE | Refills: 3 | Status: SHIPPED | OUTPATIENT
Start: 2021-01-22 | End: 2021-02-23
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
## 2021-01-25 RX ORDER — LAMOTRIGINE 100 MG/1
TABLET ORAL
Qty: 360 TABLET | Refills: 3 | COMMUNITY
Start: 2021-01-25 | End: 2021-02-08

## 2021-02-01 ENCOUNTER — TELEPHONE (OUTPATIENT)
Dept: NEUROLOGY | Facility: CLINIC | Age: 46
End: 2021-02-01

## 2021-02-01 DIAGNOSIS — G40.409 GENERALIZED TONIC-CLONIC SEIZURE (H): ICD-10-CM

## 2021-02-01 NOTE — TELEPHONE ENCOUNTER
What is the concern that needs to be addressed by a nurse? Pt was told to call after a few days of reducing lamoTRIgine (LAMICTAL) 100 MG tablet. It did not help w reducing nausea, please call back.     May a detailed message be left on voicemail? yes    Date of last office visit: 1/22/21    Message routed to: mincep rn pool

## 2021-02-02 NOTE — TELEPHONE ENCOUNTER
"Per plan from 1/22/2021 virtual visit with   \"PLAN  1) Phenytoin 100 AM  200 PM  2 LAMO now on 150 bid but reduce to  100 bid for 3 days to see if nausea clears, and to 50 bid if it does not clear.  3)  PHT levels  4) RTC 2 mo   \"    Call placed to patient,     He has reduced BROWNING as directed and is taking 50mg po bid.  He has seen no improvement in the nausea    Three days ago he also stopped taking Leafline vape oil (100%THC) as he was told long term use could lead to nausea .    Seizures have not been a problem during this transition.    Patient wondering if he should increase the BROWNING again, to try to reach the goal of 200mg BID, as the reduction has not helped nausea    Will message  for additional recommendations.  Patient in agreement with this plan      "

## 2021-02-02 NOTE — TELEPHONE ENCOUNTER
Patient called again and wants to speak with a nurse regarding the decrease in Lamotrigine. Please call back when available at [phone].

## 2021-02-04 NOTE — TELEPHONE ENCOUNTER
Patient called today and had some more questions about his medication and if Dr. Baez had given any recommendations yet?

## 2021-02-08 RX ORDER — LAMOTRIGINE 100 MG/1
TABLET ORAL
Qty: 360 TABLET | Refills: 3 | Status: SHIPPED | OUTPATIENT
Start: 2021-02-08 | End: 2021-03-02

## 2021-02-08 NOTE — TELEPHONE ENCOUNTER
Message from Amaury Barnes MD Hamley, David, RN              OK to increase back in 2 steps a week apart.=ILO        Call returned to patient and plan discussed as per MD note  He noted he will need a new prescription to the pharmacy, as the last once was filled for 50mg bid  I confirmed the preferred pharmacy on file was correct    No other questions at this time  I reminded the patient to call us if he notices a rash, as this may be a sign of a potentially serious reaction to lamotrigine, and also he should call us if he has any other question or concerns.

## 2021-02-23 ENCOUNTER — VIRTUAL VISIT (OUTPATIENT)
Dept: NEUROLOGY | Facility: CLINIC | Age: 46
End: 2021-02-23
Payer: COMMERCIAL

## 2021-02-23 DIAGNOSIS — G40.409 GENERALIZED TONIC-CLONIC SEIZURE (H): ICD-10-CM

## 2021-02-23 DIAGNOSIS — G40.409 GENERALIZED TONIC-CLONIC SEIZURE (H): Primary | ICD-10-CM

## 2021-02-23 RX ORDER — PHENYTOIN SODIUM 100 MG/1
CAPSULE, EXTENDED RELEASE ORAL
Qty: 180 CAPSULE | Refills: 3 | Status: SHIPPED | OUTPATIENT
Start: 2021-02-23 | End: 2021-02-25
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
## 2021-02-25 ENCOUNTER — TELEPHONE (OUTPATIENT)
Dept: NEUROLOGY | Facility: CLINIC | Age: 46
End: 2021-02-25

## 2021-02-25 NOTE — TELEPHONE ENCOUNTER
PHENYTOIN SODIUM 100MG EXT CAPSULES   Last Written Prescription Date:  2/23/2021  Last Fill Quantity: 180,   # refills: 3  Last Office Visit : 2/23/2021  Future Office visit:  8/6/2021    Routing refill request to provider for review/approval because:  Pt asking for a 90 day supply with refills to be sent to pharmacy for Pt care.   Please send new updated order Per Providers recommendations for Pt care.        Margie Hu RN  Central Triage Red Flags/Med Refills

## 2021-02-26 RX ORDER — PHENYTOIN SODIUM 100 MG/1
CAPSULE, EXTENDED RELEASE ORAL
Qty: 270 CAPSULE | Refills: 3 | Status: SHIPPED | OUTPATIENT
Start: 2021-02-26 | End: 2021-08-06

## 2021-03-02 DIAGNOSIS — G40.409 GENERALIZED TONIC-CLONIC SEIZURE (H): ICD-10-CM

## 2021-03-02 RX ORDER — LAMOTRIGINE 100 MG/1
200 TABLET ORAL 2 TIMES DAILY
Qty: 360 TABLET | Refills: 2 | Status: SHIPPED | OUTPATIENT
Start: 2021-03-02 | End: 2021-08-06

## 2021-03-02 NOTE — TELEPHONE ENCOUNTER
What is the concern that needs to be addressed by a nurse? Pt needs refill, new rx should be 2 100mg T bid. Please send refill to [facility] in [place]. Currently has 2 days of rx.     May a detailed message be left on voicemail? yes    Date of last office visit: 2/23/21    Message routed to: [facility] rn pool

## 2021-03-03 ENCOUNTER — TELEPHONE (OUTPATIENT)
Dept: NEUROLOGY | Facility: CLINIC | Age: 46
End: 2021-03-03

## 2021-03-03 NOTE — TELEPHONE ENCOUNTER
"Call placed to patient.  He notes that the lamotrigine is listed as not covered, refill too soon, cost $1200    Call palced to the pharmacy.  I explained the situation, and the pharmacist noted that an old prescription (for 60 tabs) had been filled, and had \"bumped\" the current prescription.    They will fill the current prescription and hopefully fix the issue    Call placed to the patient and update provided    "

## 2021-03-03 NOTE — TELEPHONE ENCOUNTER
What is the concern that needs to be addressed by a nurse? Patient called to say his pharmacy recieved the refill but when he went to pick it up his insurance isn't covering now because of the change to the RX and out of pocket cost is $1100. He is hoping we can do some thing to fix it, maybe send out a different RX? He has 1 day of medication left.    May a detailed message be left on voicemail? yes    Date of last office visit:     Message routed to: [facility] RN Pool

## 2021-03-08 ENCOUNTER — TELEPHONE (OUTPATIENT)
Dept: NEUROLOGY | Facility: CLINIC | Age: 46
End: 2021-03-08

## 2021-04-02 ENCOUNTER — TRANSFERRED RECORDS (OUTPATIENT)
Dept: HEALTH INFORMATION MANAGEMENT | Facility: CLINIC | Age: 46
End: 2021-04-02

## 2021-04-02 LAB
AST SERPL-CCNC: 17 U/L (ref 5–41)
ERYTHROCYTE [DISTWIDTH] IN BLOOD BY AUTOMATED COUNT: 12.9 % (ref 10–16.2)
HCT VFR BLD AUTO: 41.2 % (ref 38.7–51.4)
HEMOGLOBIN: 14.6 G/DL (ref 13.1–17.1)
MCH RBC QN AUTO: 30.7 PG (ref 27.6–33.2)
MCHC RBC AUTO-ENTMCNC: 35.4 G/DL (ref 32–36)
MCV RBC AUTO: 86.7 FL (ref 82.9–100.6)
PHENYTOIN SERPL-MCNC: 10.8 MCG/ML (ref 10–20)
PLATELET # BLD AUTO: 253 10(3)/UL (ref 179–450)
PMV BLD: 6.9 FL (ref 7.4–10.4)
RBC # BLD AUTO: 4.75 10(6)/UL (ref 4.08–5.79)
WBC # BLD AUTO: 5.3 10(3)/UL (ref 3.9–11.9)

## 2021-04-03 LAB — LAMOTRIGINE SERPL-MCNC: 2.3 MCG/ML (ref 2.5–15)

## 2021-04-05 DIAGNOSIS — G40.409 GENERALIZED TONIC-CLONIC SEIZURE (H): ICD-10-CM

## 2021-08-06 ENCOUNTER — VIRTUAL VISIT (OUTPATIENT)
Dept: NEUROLOGY | Facility: CLINIC | Age: 46
End: 2021-08-06
Payer: COMMERCIAL

## 2021-08-06 DIAGNOSIS — G40.409 GENERALIZED TONIC-CLONIC SEIZURE (H): Primary | ICD-10-CM

## 2021-08-06 RX ORDER — LAMOTRIGINE 100 MG/1
200 TABLET ORAL 2 TIMES DAILY
Qty: 360 TABLET | Refills: 2 | Status: SHIPPED | OUTPATIENT
Start: 2021-08-06 | End: 2022-02-01

## 2021-08-06 RX ORDER — PHENYTOIN SODIUM 100 MG/1
CAPSULE, EXTENDED RELEASE ORAL
Qty: 270 CAPSULE | Refills: 3 | Status: SHIPPED | OUTPATIENT
Start: 2021-08-06 | End: 2022-02-01
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
## 2021-10-11 ENCOUNTER — HEALTH MAINTENANCE LETTER (OUTPATIENT)
Age: 46
End: 2021-10-11

## 2021-12-03 ENCOUNTER — TELEPHONE (OUTPATIENT)
Dept: NEUROLOGY | Facility: CLINIC | Age: 46
End: 2021-12-03
Payer: COMMERCIAL

## 2021-12-03 NOTE — TELEPHONE ENCOUNTER
Received DMV form to be completed.  Form saved to the [employer] [address].  Encounter routed.

## 2021-12-07 NOTE — TELEPHONE ENCOUNTER
DMV form was prepared in alignment with the providers note. It is ready for the provider's signature.

## 2022-01-30 ENCOUNTER — HEALTH MAINTENANCE LETTER (OUTPATIENT)
Age: 47
End: 2022-01-30

## 2022-02-01 ENCOUNTER — VIRTUAL VISIT (OUTPATIENT)
Dept: NEUROLOGY | Facility: CLINIC | Age: 47
End: 2022-02-01
Payer: COMMERCIAL

## 2022-02-01 DIAGNOSIS — G40.409 GENERALIZED TONIC-CLONIC SEIZURE (H): ICD-10-CM

## 2022-02-01 RX ORDER — LAMOTRIGINE 100 MG/1
200 TABLET ORAL 2 TIMES DAILY
Qty: 360 TABLET | Refills: 2 | Status: SHIPPED | OUTPATIENT
Start: 2022-02-01 | End: 2023-03-13

## 2022-02-01 RX ORDER — PHENYTOIN SODIUM 100 MG/1
CAPSULE, EXTENDED RELEASE ORAL
Qty: 270 CAPSULE | Refills: 3 | Status: SHIPPED | OUTPATIENT
Start: 2022-02-01 | End: 2023-02-22
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
## 2022-09-24 ENCOUNTER — HEALTH MAINTENANCE LETTER (OUTPATIENT)
Age: 47
End: 2022-09-24

## 2022-12-29 ENCOUNTER — TELEPHONE (OUTPATIENT)
Dept: NEUROLOGY | Facility: CLINIC | Age: 47
End: 2022-12-29

## 2022-12-29 NOTE — TELEPHONE ENCOUNTER
Received DMV Form to be completed. Form saved to [employer], encounter routed.  Soco Sutton CMA     Statement Selected

## 2023-01-29 ENCOUNTER — HEALTH MAINTENANCE LETTER (OUTPATIENT)
Age: 48
End: 2023-01-29

## 2023-02-19 DIAGNOSIS — G40.409 GENERALIZED TONIC-CLONIC SEIZURE (H): ICD-10-CM

## 2023-02-22 RX ORDER — PHENYTOIN SODIUM 100 MG/1
CAPSULE, EXTENDED RELEASE ORAL
Qty: 90 CAPSULE | Refills: 0 | Status: SHIPPED | OUTPATIENT
Start: 2023-02-22 | End: 2023-04-10

## 2023-02-24 PROCEDURE — 88305 TISSUE EXAM BY PATHOLOGIST: CPT | Performed by: PATHOLOGY

## 2023-02-27 ENCOUNTER — LAB REQUISITION (OUTPATIENT)
Dept: LAB | Facility: CLINIC | Age: 48
End: 2023-02-27

## 2023-02-27 DIAGNOSIS — K51.30 ULCERATIVE (CHRONIC) RECTOSIGMOIDITIS WITHOUT COMPLICATIONS (H): ICD-10-CM

## 2023-03-01 LAB
PATH REPORT.COMMENTS IMP SPEC: NORMAL
PATH REPORT.COMMENTS IMP SPEC: NORMAL
PATH REPORT.FINAL DX SPEC: NORMAL
PATH REPORT.GROSS SPEC: NORMAL
PATH REPORT.MICROSCOPIC SPEC OTHER STN: NORMAL
PATH REPORT.RELEVANT HX SPEC: NORMAL
PHOTO IMAGE: NORMAL

## 2023-03-09 DIAGNOSIS — G40.409 GENERALIZED TONIC-CLONIC SEIZURE (H): ICD-10-CM

## 2023-03-13 RX ORDER — LAMOTRIGINE 100 MG/1
200 TABLET ORAL 2 TIMES DAILY
Qty: 124 TABLET | Refills: 0 | Status: SHIPPED | OUTPATIENT
Start: 2023-03-13 | End: 2023-04-10

## 2023-03-13 NOTE — TELEPHONE ENCOUNTER
LCV:2/1/2022  [facility]  Filling per [facility] medication refill protocols - seizure medications.  Not all labs required.  Scheduling has been notified to contact the pt for appointment.

## 2023-04-10 ENCOUNTER — TELEPHONE (OUTPATIENT)
Dept: NEUROLOGY | Facility: CLINIC | Age: 48
End: 2023-04-10

## 2023-04-10 DIAGNOSIS — G40.409 GENERALIZED TONIC-CLONIC SEIZURE (H): ICD-10-CM

## 2023-04-10 RX ORDER — PHENYTOIN SODIUM 100 MG/1
CAPSULE, EXTENDED RELEASE ORAL
Qty: 90 CAPSULE | Refills: 0 | Status: SHIPPED | OUTPATIENT
Start: 2023-04-10 | End: 2023-04-14

## 2023-04-10 RX ORDER — LAMOTRIGINE 100 MG/1
200 TABLET ORAL 2 TIMES DAILY
Qty: 124 TABLET | Refills: 0 | Status: SHIPPED | OUTPATIENT
Start: 2023-04-10 | End: 2023-04-14

## 2023-04-10 NOTE — TELEPHONE ENCOUNTER
lamoTRIgine (LAMICTAL) 100 MG tablet      Last Written Prescription Date:  3-13-23  Last Fill Quantity: 124,   # refills: 0  Last Office Visit : 2-1-22  Future Office visit:  4-14-23  RF 124t:0  Filling per [facility] medication refill protocols - seizure medications.  Not all labs required.    Dilantin 100 mg       Last Written Prescription Date:  2-22-23  Last Fill Quantity: 90,   # refills: 0  Last AED:4-2-21  RF 90T;0    Filling per [facility] medication refill protocols - seizure medications.  Not all labs required.    Previous override  Overridden by Amaury Baez MD on Aug 6, 2021 2:29 PM   Drug-Drug   1. PHENYTOIN / LAMOTRIGINE [Level: Major]   Other Orders: lamoTRIgine (LAMICTAL) 100 MG tablet

## 2023-04-10 NOTE — TELEPHONE ENCOUNTER
What is the concern that needs to be addressed by a nurse? Patient called in stating that he is out off both LamoTRIgine 100mg and Dilantin 100 mg medication and would like refill sent to local pharmacy.    [id number] - [place], [place] - [address] AT [facility] OF [address]  [address]  [place] [address]  Phone: [phone] Fax: [phone]        May a detailed message be left on voicemail? Yes       Message routed to:timmy santos

## 2023-04-14 ENCOUNTER — VIRTUAL VISIT (OUTPATIENT)
Dept: NEUROLOGY | Facility: CLINIC | Age: 48
End: 2023-04-14
Payer: COMMERCIAL

## 2023-04-14 VITALS — BODY MASS INDEX: 23.19 KG/M2 | HEIGHT: 73 IN | WEIGHT: 175 LBS

## 2023-04-14 DIAGNOSIS — G40.409 GENERALIZED TONIC-CLONIC SEIZURE (H): ICD-10-CM

## 2023-04-14 RX ORDER — PHENYTOIN SODIUM 100 MG/1
CAPSULE, EXTENDED RELEASE ORAL
Qty: 360 CAPSULE | Refills: 3 | Status: SHIPPED | OUTPATIENT
Start: 2023-04-14 | End: 2024-02-28

## 2023-04-14 RX ORDER — LAMOTRIGINE 100 MG/1
200 TABLET ORAL 2 TIMES DAILY
Qty: 360 TABLET | Refills: 3 | Status: SHIPPED | OUTPATIENT
Start: 2023-04-14 | End: 2024-02-28

## 2023-04-14 ASSESSMENT — PAIN SCALES - GENERAL: PAINLEVEL: NO PAIN (0)

## 2023-04-14 ASSESSMENT — PATIENT HEALTH QUESTIONNAIRE - PHQ9: SUM OF ALL RESPONSES TO PHQ QUESTIONS 1-9: 13

## 2023-04-14 NOTE — NURSING NOTE
PHQ-9 score is 13.     Is the patient currently in the state of [place]? YES    Visit mode:VIDEO    If the visit is dropped, the patient can be reconnected by: VIDEO VISIT: Text to cell phone: [phone]    Will anyone else be joining the visit? NO    How would you like to obtain your AVS? MyChart    Are changes needed to the allergy or medication list? NO    Reason for visit: Video Visit (Follow up appt. )    Medication and allergies have been reviewed.     Jude Pickett, VF
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
## 2024-01-31 ENCOUNTER — TELEPHONE (OUTPATIENT)
Dept: NEUROLOGY | Facility: CLINIC | Age: 49
End: 2024-01-31

## 2024-01-31 NOTE — TELEPHONE ENCOUNTER
Received DMV (LOC) Form to be completed. Form saved to [facility], encounter routed.  Soco Sutton CMA

## 2024-02-25 ENCOUNTER — HEALTH MAINTENANCE LETTER (OUTPATIENT)
Age: 49
End: 2024-02-25

## 2024-02-27 ENCOUNTER — OFFICE VISIT (OUTPATIENT)
Dept: NEUROLOGY | Facility: CLINIC | Age: 49
End: 2024-02-27
Payer: COMMERCIAL

## 2024-02-27 VITALS
HEART RATE: 84 BPM | SYSTOLIC BLOOD PRESSURE: 135 MMHG | DIASTOLIC BLOOD PRESSURE: 84 MMHG | OXYGEN SATURATION: 100 % | TEMPERATURE: 97.5 F

## 2024-02-27 DIAGNOSIS — G40.409 OTHER GENERALIZED EPILEPSY, NOT INTRACTABLE, WITHOUT STATUS EPILEPTICUS (H): Primary | ICD-10-CM

## 2024-02-27 DIAGNOSIS — G40.409 GENERALIZED TONIC-CLONIC SEIZURE (H): ICD-10-CM

## 2024-02-27 ASSESSMENT — PAIN SCALES - GENERAL: PAINLEVEL: NO PAIN (0)
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
## 2024-02-28 LAB — PHENYTOIN SERPL-MCNC: 7.2 UG/ML

## 2024-02-28 RX ORDER — LAMOTRIGINE 100 MG/1
200 TABLET ORAL 2 TIMES DAILY
Qty: 360 TABLET | Refills: 3 | Status: SHIPPED | OUTPATIENT
Start: 2024-02-28 | End: 2024-08-09

## 2024-02-28 RX ORDER — PHENYTOIN SODIUM 100 MG/1
CAPSULE, EXTENDED RELEASE ORAL
Qty: 360 CAPSULE | Refills: 3 | Status: SHIPPED | OUTPATIENT
Start: 2024-02-28 | End: 2024-08-09

## 2024-02-29 LAB — LAMOTRIGINE SERPL-MCNC: 3.7 UG/ML

## 2024-04-24 DIAGNOSIS — G40.409 GENERALIZED TONIC-CLONIC SEIZURE (H): ICD-10-CM

## 2024-05-01 RX ORDER — PHENYTOIN SODIUM 100 MG/1
CAPSULE, EXTENDED RELEASE ORAL
Qty: 360 CAPSULE | Refills: 3 | OUTPATIENT
Start: 2024-05-01

## 2024-05-01 NOTE — TELEPHONE ENCOUNTER
phenytoin (DILANTIN) 100 MG ER capsule 360 capsule 3 2/28/2024       Should have refills on file. Pharmacy sent message. Rx refill denied    Kari Fried RN  P Red Flag Triage/MRT

## 2024-08-09 ENCOUNTER — VIRTUAL VISIT (OUTPATIENT)
Dept: NEUROLOGY | Facility: CLINIC | Age: 49
End: 2024-08-09
Payer: COMMERCIAL

## 2024-08-09 DIAGNOSIS — G40.409 GENERALIZED TONIC-CLONIC SEIZURE (H): ICD-10-CM

## 2024-08-09 RX ORDER — LAMOTRIGINE 100 MG/1
200 TABLET ORAL 2 TIMES DAILY
Qty: 360 TABLET | Refills: 3 | Status: SHIPPED | OUTPATIENT
Start: 2024-08-09

## 2024-08-09 RX ORDER — PHENYTOIN SODIUM 100 MG/1
CAPSULE, EXTENDED RELEASE ORAL
Qty: 360 CAPSULE | Refills: 3 | Status: SHIPPED | OUTPATIENT
Start: 2024-08-09
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
## 2024-09-01 ENCOUNTER — TELEPHONE (OUTPATIENT)
Dept: NEUROLOGY | Facility: CLINIC | Age: 49
End: 2024-09-01
Payer: COMMERCIAL

## 2024-09-01 NOTE — TELEPHONE ENCOUNTER
"He is calling in because he was tapering the phenytoin. Previously was taking 200 BID, decreased to 100 in the morning and 200 at bedtime approximately 1 month ago.    He is now waking up with migraines. He will look in the mirror and have a scrunched up look  - feels like brow is more furrowed. This goes away after 2-3 hours. Not waking up with bladder/bowel incontinence or with tongue/cheek lacerations. He does have a bed partner who has not noticed any unusual movements in bed. Also feeling like he's having occasional \"electrical zings\" in the back of his neck. These other symptoms started approximately 5 days ago. No other symptoms besides these and feeling weak. No other medication changes. This feeling is not similar to previous seizure episodes which were more classic GTC. No signs of illness that he's aware of.     In the past 2 nights, he increased back up 2 tablets twice per day. No change in the above symptoms.     We discussed that these do not sound like seizures to me. However if he has any more severe new symptoms or if he does not feel safe at home, he can consider going to the local ED for evaluation. I think it's fine to continue on phenytoin 200 mg BID until he is able to discuss with Dr. Baez or his office - he has enough tabs to do so. I will notify Dr. Baez of this conversation.     The patient would appreciate a call back once clinic is open after the holiday weekend to discuss plan for phenytoin.     Sofy Forde MD    [facility]  Department of Neurology   "

## 2025-03-15 ENCOUNTER — HEALTH MAINTENANCE LETTER (OUTPATIENT)
Age: 50
End: 2025-03-15

## 2025-04-08 ENCOUNTER — TELEPHONE (OUTPATIENT)
Dept: NEUROLOGY | Facility: CLINIC | Age: 50
End: 2025-04-08

## 2025-04-08 NOTE — TELEPHONE ENCOUNTER
Received DMV(LOC) Form to be completed. Form saved to [employer], encounter routed.  Devendra Buckley LPN

## 2025-04-11 NOTE — TELEPHONE ENCOUNTER
Form prepared for provider signature and placed in provider basket.   Hemant Atkins [place]   VJ=458 bpm, LRBR=242/54 mmhg, SpO2=95.0 %, Resp=20 B/min, EtCO2=36 mmHg, Apnea=5 Seconds, Alcazar=2

## 2025-04-14 NOTE — TELEPHONE ENCOUNTER
DMV form signed, faxed and mailed to [employer] and on 04/14/2025, sent to scanning, and copy mailed to patient.

## 2025-07-13 DIAGNOSIS — G40.409 GENERALIZED TONIC-CLONIC SEIZURE (H): ICD-10-CM

## 2025-07-14 ENCOUNTER — MYC MEDICAL ADVICE (OUTPATIENT)
Dept: NEUROLOGY | Facility: CLINIC | Age: 50
End: 2025-07-14

## 2025-07-14 DIAGNOSIS — G40.409 GENERALIZED TONIC-CLONIC SEIZURE (H): ICD-10-CM

## 2025-07-14 RX ORDER — LAMOTRIGINE 100 MG/1
200 TABLET ORAL 2 TIMES DAILY
Qty: 120 TABLET | Refills: 1 | Status: SHIPPED | OUTPATIENT
Start: 2025-07-14

## 2025-07-14 RX ORDER — LAMOTRIGINE 100 MG/1
TABLET ORAL
Qty: 120 TABLET | Refills: 0 | OUTPATIENT
Start: 2025-07-14

## 2025-07-14 NOTE — TELEPHONE ENCOUNTER
Last seen: 8/9/24   RTC: 6 months  Cancel: yes  No-show: no  Next appt: none - MyChart message sent to patient and routed to .    Incoming refill from patient via [facility]    Medication requested: lamoTRIgine (LAMICTAL) 100 MG table   Directions: Take 2 tablets (200 mg) by mouth 2 times daily   Qty: 120  Last refilled: 6/9/24    Medication refill approved per refill protocol

## 2025-09-02 ENCOUNTER — VIRTUAL VISIT (OUTPATIENT)
Dept: NEUROLOGY | Facility: CLINIC | Age: 50
End: 2025-09-02
Payer: COMMERCIAL

## 2025-09-02 DIAGNOSIS — G40.409 GENERALIZED TONIC-CLONIC SEIZURE (H): ICD-10-CM

## 2025-09-02 RX ORDER — LAMOTRIGINE 100 MG/1
200 TABLET ORAL 2 TIMES DAILY
Qty: 120 TABLET | Refills: 1 | Status: SHIPPED | OUTPATIENT
Start: 2025-09-02

## 2025-09-02 RX ORDER — PHENYTOIN SODIUM 100 MG/1
CAPSULE, EXTENDED RELEASE ORAL
Qty: 180 CAPSULE | Refills: 3 | Status: SHIPPED | OUTPATIENT
Start: 2025-09-02